# Patient Record
Sex: FEMALE | Race: WHITE | URBAN - NONMETROPOLITAN AREA
[De-identification: names, ages, dates, MRNs, and addresses within clinical notes are randomized per-mention and may not be internally consistent; named-entity substitution may affect disease eponyms.]

---

## 2019-01-14 ENCOUNTER — NEW PATIENT (OUTPATIENT)
Dept: URBAN - NONMETROPOLITAN AREA CLINIC 12 | Facility: CLINIC | Age: 64
End: 2019-01-14
Payer: COMMERCIAL

## 2019-01-14 DIAGNOSIS — H52.4 PRESBYOPIA: ICD-10-CM

## 2019-01-14 DIAGNOSIS — H25.813 COMBINED FORMS OF AGE-RELATED CATARACT, BILATERAL: Primary | ICD-10-CM

## 2019-01-14 PROCEDURE — 92015 DETERMINE REFRACTIVE STATE: CPT | Performed by: OPTOMETRIST

## 2019-01-14 PROCEDURE — 92004 COMPRE OPH EXAM NEW PT 1/>: CPT | Performed by: OPTOMETRIST

## 2019-01-14 ASSESSMENT — VISUAL ACUITY
OD: 20/30
OS: 20/25

## 2019-01-14 ASSESSMENT — INTRAOCULAR PRESSURE
OS: 18
OD: 13

## 2019-12-17 ENCOUNTER — NEW PATIENT (OUTPATIENT)
Dept: URBAN - NONMETROPOLITAN AREA CLINIC 12 | Facility: CLINIC | Age: 64
End: 2019-12-17
Payer: MEDICARE

## 2019-12-17 DIAGNOSIS — H52.4 PRESBYOPIA: ICD-10-CM

## 2019-12-17 PROCEDURE — 92004 COMPRE OPH EXAM NEW PT 1/>: CPT | Performed by: OPTOMETRIST

## 2019-12-17 ASSESSMENT — INTRAOCULAR PRESSURE
OS: 12
OD: 13

## 2019-12-17 ASSESSMENT — VISUAL ACUITY
OS: 20/25
OD: 20/25

## 2020-03-10 ENCOUNTER — FOLLOW UP ESTABLISHED (OUTPATIENT)
Dept: URBAN - NONMETROPOLITAN AREA CLINIC 12 | Facility: CLINIC | Age: 65
End: 2020-03-10
Payer: MEDICARE

## 2020-03-10 DIAGNOSIS — H25.813 COMBINED FORMS OF AGE-RELATED CATARACT, BILATERAL: Primary | ICD-10-CM

## 2020-03-10 DIAGNOSIS — H16.223 KERATOCONJUNCT SICCA, NOT SPECIFIED AS SJOGREN'S, BILATERAL: ICD-10-CM

## 2020-03-10 PROCEDURE — 92014 COMPRE OPH EXAM EST PT 1/>: CPT | Performed by: OPTOMETRIST

## 2020-03-10 ASSESSMENT — VISUAL ACUITY
OD: 20/25
OS: 20/25

## 2020-03-10 ASSESSMENT — INTRAOCULAR PRESSURE
OS: 17
OD: 16

## 2021-05-17 ENCOUNTER — OFFICE VISIT (OUTPATIENT)
Dept: URBAN - NONMETROPOLITAN AREA CLINIC 12 | Facility: CLINIC | Age: 66
End: 2021-05-17
Payer: MEDICARE

## 2021-05-17 PROCEDURE — 92014 COMPRE OPH EXAM EST PT 1/>: CPT | Performed by: OPTOMETRIST

## 2021-05-17 ASSESSMENT — INTRAOCULAR PRESSURE
OD: 13
OS: 15

## 2021-10-18 ENCOUNTER — OFFICE VISIT (OUTPATIENT)
Dept: URBAN - NONMETROPOLITAN AREA CLINIC 14 | Facility: CLINIC | Age: 66
End: 2021-10-18
Payer: MEDICARE

## 2021-10-18 PROCEDURE — 99214 OFFICE O/P EST MOD 30 MIN: CPT | Performed by: OPTOMETRIST

## 2021-10-18 ASSESSMENT — VISUAL ACUITY
OS: 20/30
OD: 20/40

## 2021-10-18 NOTE — IMPRESSION/PLAN
Impression: Combined forms of age-related cataract, bilateral: H25.813. Plan: Cataracts not ready for surgery at this time. Cont to monitor for now. Patient found old glasses and they are working well.

## 2021-12-13 ENCOUNTER — OFFICE VISIT (OUTPATIENT)
Dept: URBAN - NONMETROPOLITAN AREA CLINIC 14 | Facility: CLINIC | Age: 66
End: 2021-12-13
Payer: MEDICARE

## 2021-12-13 PROCEDURE — 99214 OFFICE O/P EST MOD 30 MIN: CPT | Performed by: OPTOMETRIST

## 2021-12-13 ASSESSMENT — VISUAL ACUITY
OS: 20/30
OD: 20/30

## 2021-12-13 ASSESSMENT — INTRAOCULAR PRESSURE
OS: 16
OD: 15

## 2021-12-13 NOTE — IMPRESSION/PLAN
Impression: Combined forms of age-related cataract, bilateral: H25.813. Plan: Patient not happy with vision. PSC mild but having an impact on vision. Refer for surgery OU.

## 2021-12-14 ENCOUNTER — OFFICE VISIT (OUTPATIENT)
Dept: URBAN - NONMETROPOLITAN AREA CLINIC 13 | Facility: CLINIC | Age: 66
End: 2021-12-14
Payer: MEDICARE

## 2021-12-14 DIAGNOSIS — H25.812 COMBINED FORMS OF AGE-RELATED CATARACT, LEFT EYE: ICD-10-CM

## 2021-12-14 PROCEDURE — 99204 OFFICE O/P NEW MOD 45 MIN: CPT | Performed by: OPHTHALMOLOGY

## 2021-12-14 ASSESSMENT — INTRAOCULAR PRESSURE
OS: 14
OD: 12

## 2021-12-14 ASSESSMENT — VISUAL ACUITY
OS: 20/30
OD: 20/30

## 2021-12-14 NOTE — IMPRESSION/PLAN
Impression: Combined forms of age-related cataract, bilateral: H25.813. Plan: Discussed diagnosis in detail with patient. Discussed treatment options with patient. Patient elects to have surgery. Surgical risks and benefits were discussed, explained and understood by patient.  OD then OS std iol aim plano OD plano OS ok dexycu order ascan/optical biometry for iol calculations stv vs pan optix

## 2021-12-30 ENCOUNTER — TESTING ONLY (OUTPATIENT)
Dept: URBAN - NONMETROPOLITAN AREA CLINIC 13 | Facility: CLINIC | Age: 66
End: 2021-12-30
Payer: MEDICARE

## 2021-12-30 DIAGNOSIS — Z01.818 ENCOUNTER FOR OTHER PREPROCEDURAL EXAMINATION: Primary | ICD-10-CM

## 2021-12-30 PROCEDURE — 99203 OFFICE O/P NEW LOW 30 MIN: CPT | Performed by: PHYSICIAN ASSISTANT

## 2021-12-30 PROCEDURE — 92025 CPTRIZED CORNEAL TOPOGRAPHY: CPT | Performed by: OPHTHALMOLOGY

## 2021-12-30 ASSESSMENT — PACHYMETRY
OS: 3.50
OD: 3.52
OS: 23.83
OD: 23.83

## 2022-01-10 ENCOUNTER — SURGERY (OUTPATIENT)
Dept: URBAN - NONMETROPOLITAN AREA SURGERY 4 | Facility: SURGERY | Age: 67
End: 2022-01-10
Payer: MEDICARE

## 2022-01-10 PROCEDURE — 66984 XCAPSL CTRC RMVL W/O ECP: CPT | Performed by: OPHTHALMOLOGY

## 2022-01-11 ENCOUNTER — POST-OPERATIVE VISIT (OUTPATIENT)
Dept: URBAN - NONMETROPOLITAN AREA CLINIC 14 | Facility: CLINIC | Age: 67
End: 2022-01-11

## 2022-01-11 DIAGNOSIS — Z48.810 ENCOUNTER FOR SURGICAL AFTERCARE FOLLOWING SURGERY ON A SENSE ORGAN: Primary | ICD-10-CM

## 2022-01-11 PROCEDURE — 99024 POSTOP FOLLOW-UP VISIT: CPT | Performed by: OPTOMETRIST

## 2022-01-11 ASSESSMENT — INTRAOCULAR PRESSURE
OD: 16
OS: 18

## 2022-01-11 NOTE — IMPRESSION/PLAN
Impression: S/P Cataract Extraction by phacoemulsification with IOL placement OD - 1 Day. Encounter for surgical aftercare following surgery on a sense organ  Z48.810. Plan: Doing well. Post op instructions discussed. Will RTC if any concerns arise.

## 2022-01-18 ENCOUNTER — POST-OPERATIVE VISIT (OUTPATIENT)
Dept: URBAN - NONMETROPOLITAN AREA CLINIC 14 | Facility: CLINIC | Age: 67
End: 2022-01-18

## 2022-01-18 PROCEDURE — 99024 POSTOP FOLLOW-UP VISIT: CPT | Performed by: OPTOMETRIST

## 2022-01-18 ASSESSMENT — INTRAOCULAR PRESSURE
OS: 17
OD: 17

## 2022-01-18 ASSESSMENT — VISUAL ACUITY
OD: 20/25
OS: 20/50

## 2022-01-18 NOTE — IMPRESSION/PLAN
Impression: S/P Cataract Extraction by phacoemulsification with IOL placement OD - 8 Days. Encounter for surgical aftercare following surgery on a sense organ  Z48.810. Plan: Doing well. Post op instructions discussed. Will RTC if any concerns arise.

## 2022-01-24 ENCOUNTER — SURGERY (OUTPATIENT)
Dept: URBAN - NONMETROPOLITAN AREA SURGERY 4 | Facility: SURGERY | Age: 67
End: 2022-01-24
Payer: MEDICARE

## 2022-01-24 PROCEDURE — 66984 XCAPSL CTRC RMVL W/O ECP: CPT | Performed by: OPHTHALMOLOGY

## 2022-01-25 ENCOUNTER — POST-OPERATIVE VISIT (OUTPATIENT)
Dept: URBAN - NONMETROPOLITAN AREA CLINIC 14 | Facility: CLINIC | Age: 67
End: 2022-01-25
Payer: MEDICARE

## 2022-01-25 PROCEDURE — 99024 POSTOP FOLLOW-UP VISIT: CPT | Performed by: OPTOMETRIST

## 2022-01-25 ASSESSMENT — INTRAOCULAR PRESSURE
OS: 24
OD: 16

## 2022-01-25 NOTE — IMPRESSION/PLAN
Impression: S/P Cataract Extraction by phacoemulsification with IOL placement OS - 1 Day. Encounter for surgical aftercare following surgery on a sense organ  Z48.810. Plan: Doing well. Post op instructions discussed. Will RTC if any concerns arise.

## 2022-02-01 ENCOUNTER — POST-OPERATIVE VISIT (OUTPATIENT)
Dept: URBAN - NONMETROPOLITAN AREA CLINIC 14 | Facility: CLINIC | Age: 67
End: 2022-02-01

## 2022-02-01 PROCEDURE — 99024 POSTOP FOLLOW-UP VISIT: CPT | Performed by: OPTOMETRIST

## 2022-02-01 ASSESSMENT — INTRAOCULAR PRESSURE
OS: 13
OD: 14

## 2022-02-01 ASSESSMENT — VISUAL ACUITY
OD: 20/30
OS: 20/30

## 2022-02-01 NOTE — IMPRESSION/PLAN
Impression: S/P Cataract Extraction by phacoemulsification with IOL placement OS - 8 Days. Encounter for surgical aftercare following surgery on a sense organ  Z48.810. Plan: Lenses are doing well. Cont to monitor.

## 2022-02-22 ENCOUNTER — POST-OPERATIVE VISIT (OUTPATIENT)
Dept: URBAN - NONMETROPOLITAN AREA CLINIC 14 | Facility: CLINIC | Age: 67
End: 2022-02-22

## 2022-02-22 PROCEDURE — 99024 POSTOP FOLLOW-UP VISIT: CPT | Performed by: OPTOMETRIST

## 2022-02-22 ASSESSMENT — INTRAOCULAR PRESSURE
OS: 14
OD: 12

## 2022-02-22 ASSESSMENT — VISUAL ACUITY
OD: 20/25
OS: 20/30

## 2022-02-22 NOTE — IMPRESSION/PLAN
Impression:  Encounter for surgical aftercare following surgery on a sense organ  Z48.810.  Plan: Glasses Rx given

## 2022-11-08 ENCOUNTER — OFFICE VISIT (OUTPATIENT)
Dept: URBAN - NONMETROPOLITAN AREA CLINIC 14 | Facility: CLINIC | Age: 67
End: 2022-11-08
Payer: MEDICARE

## 2022-11-08 DIAGNOSIS — Z96.1 PRESENCE OF INTRAOCULAR LENS: Primary | ICD-10-CM

## 2022-11-08 PROCEDURE — 99213 OFFICE O/P EST LOW 20 MIN: CPT | Performed by: OPTOMETRIST

## 2022-11-08 ASSESSMENT — INTRAOCULAR PRESSURE
OD: 10
OS: 11

## 2022-11-08 ASSESSMENT — VISUAL ACUITY
OD: 20/25
OS: 20/30

## 2023-11-08 ENCOUNTER — OFFICE VISIT (OUTPATIENT)
Dept: URBAN - NONMETROPOLITAN AREA CLINIC 14 | Facility: CLINIC | Age: 68
End: 2023-11-08
Payer: MEDICARE

## 2023-11-08 DIAGNOSIS — Z96.1 PRESENCE OF INTRAOCULAR LENS: Primary | ICD-10-CM

## 2023-11-08 PROCEDURE — 99213 OFFICE O/P EST LOW 20 MIN: CPT | Performed by: OPTOMETRIST

## 2023-11-08 ASSESSMENT — INTRAOCULAR PRESSURE
OD: 15
OS: 17

## 2024-11-20 ENCOUNTER — OFFICE VISIT (OUTPATIENT)
Dept: URBAN - NONMETROPOLITAN AREA CLINIC 14 | Facility: CLINIC | Age: 69
End: 2024-11-20
Payer: MEDICARE

## 2024-11-20 DIAGNOSIS — Z96.1 PRESENCE OF INTRAOCULAR LENS: Primary | ICD-10-CM

## 2024-11-20 PROCEDURE — 99213 OFFICE O/P EST LOW 20 MIN: CPT | Performed by: OPTOMETRIST

## 2024-11-20 ASSESSMENT — INTRAOCULAR PRESSURE
OS: 17
OD: 15